# Patient Record
Sex: MALE | Race: OTHER | HISPANIC OR LATINO | URBAN - METROPOLITAN AREA
[De-identification: names, ages, dates, MRNs, and addresses within clinical notes are randomized per-mention and may not be internally consistent; named-entity substitution may affect disease eponyms.]

---

## 2020-10-30 ENCOUNTER — EMERGENCY (EMERGENCY)
Facility: HOSPITAL | Age: 65
LOS: 1 days | Discharge: HOME | End: 2020-10-30
Attending: STUDENT IN AN ORGANIZED HEALTH CARE EDUCATION/TRAINING PROGRAM
Payer: SELF-PAY

## 2020-10-30 VITALS
OXYGEN SATURATION: 97 % | RESPIRATION RATE: 18 BRPM | DIASTOLIC BLOOD PRESSURE: 111 MMHG | TEMPERATURE: 98 F | SYSTOLIC BLOOD PRESSURE: 180 MMHG | HEART RATE: 66 BPM

## 2020-10-30 VITALS
OXYGEN SATURATION: 98 % | SYSTOLIC BLOOD PRESSURE: 155 MMHG | HEART RATE: 54 BPM | RESPIRATION RATE: 16 BRPM | DIASTOLIC BLOOD PRESSURE: 97 MMHG

## 2020-10-30 LAB
ALBUMIN SERPL ELPH-MCNC: 4.1 G/DL — SIGNIFICANT CHANGE UP (ref 3.3–5.2)
ALP SERPL-CCNC: 74 U/L — SIGNIFICANT CHANGE UP (ref 40–120)
ALT FLD-CCNC: 12 U/L — SIGNIFICANT CHANGE UP
ANION GAP SERPL CALC-SCNC: 15 MMOL/L — SIGNIFICANT CHANGE UP (ref 5–17)
APTT BLD: 30.6 SEC — SIGNIFICANT CHANGE UP (ref 27.5–35.5)
AST SERPL-CCNC: 19 U/L — SIGNIFICANT CHANGE UP
BASOPHILS # BLD AUTO: 0.04 K/UL — SIGNIFICANT CHANGE UP (ref 0–0.2)
BASOPHILS NFR BLD AUTO: 0.6 % — SIGNIFICANT CHANGE UP (ref 0–2)
BILIRUB SERPL-MCNC: 0.4 MG/DL — SIGNIFICANT CHANGE UP (ref 0.4–2)
BUN SERPL-MCNC: 15 MG/DL — SIGNIFICANT CHANGE UP (ref 8–20)
CALCIUM SERPL-MCNC: 8.8 MG/DL — SIGNIFICANT CHANGE UP (ref 8.6–10.2)
CHLORIDE SERPL-SCNC: 103 MMOL/L — SIGNIFICANT CHANGE UP (ref 98–107)
CO2 SERPL-SCNC: 24 MMOL/L — SIGNIFICANT CHANGE UP (ref 22–29)
CREAT SERPL-MCNC: 1.01 MG/DL — SIGNIFICANT CHANGE UP (ref 0.5–1.3)
EOSINOPHIL # BLD AUTO: 0.42 K/UL — SIGNIFICANT CHANGE UP (ref 0–0.5)
EOSINOPHIL NFR BLD AUTO: 6 % — SIGNIFICANT CHANGE UP (ref 0–6)
GLUCOSE SERPL-MCNC: 94 MG/DL — SIGNIFICANT CHANGE UP (ref 70–99)
HCT VFR BLD CALC: 47.9 % — SIGNIFICANT CHANGE UP (ref 39–50)
HGB BLD-MCNC: 16.4 G/DL — SIGNIFICANT CHANGE UP (ref 13–17)
IMM GRANULOCYTES NFR BLD AUTO: 0.3 % — SIGNIFICANT CHANGE UP (ref 0–1.5)
INR BLD: 1.05 RATIO — SIGNIFICANT CHANGE UP (ref 0.88–1.16)
LIDOCAIN IGE QN: 23 U/L — SIGNIFICANT CHANGE UP (ref 22–51)
LYMPHOCYTES # BLD AUTO: 2.02 K/UL — SIGNIFICANT CHANGE UP (ref 1–3.3)
LYMPHOCYTES # BLD AUTO: 28.9 % — SIGNIFICANT CHANGE UP (ref 13–44)
MCHC RBC-ENTMCNC: 31.8 PG — SIGNIFICANT CHANGE UP (ref 27–34)
MCHC RBC-ENTMCNC: 34.2 GM/DL — SIGNIFICANT CHANGE UP (ref 32–36)
MCV RBC AUTO: 93 FL — SIGNIFICANT CHANGE UP (ref 80–100)
MONOCYTES # BLD AUTO: 0.44 K/UL — SIGNIFICANT CHANGE UP (ref 0–0.9)
MONOCYTES NFR BLD AUTO: 6.3 % — SIGNIFICANT CHANGE UP (ref 2–14)
NEUTROPHILS # BLD AUTO: 4.06 K/UL — SIGNIFICANT CHANGE UP (ref 1.8–7.4)
NEUTROPHILS NFR BLD AUTO: 57.9 % — SIGNIFICANT CHANGE UP (ref 43–77)
NT-PROBNP SERPL-SCNC: 11 PG/ML — SIGNIFICANT CHANGE UP (ref 0–300)
PLATELET # BLD AUTO: 209 K/UL — SIGNIFICANT CHANGE UP (ref 150–400)
POTASSIUM SERPL-MCNC: 4 MMOL/L — SIGNIFICANT CHANGE UP (ref 3.5–5.3)
POTASSIUM SERPL-SCNC: 4 MMOL/L — SIGNIFICANT CHANGE UP (ref 3.5–5.3)
PROT SERPL-MCNC: 7 G/DL — SIGNIFICANT CHANGE UP (ref 6.6–8.7)
PROTHROM AB SERPL-ACNC: 12.2 SEC — SIGNIFICANT CHANGE UP (ref 10.6–13.6)
RBC # BLD: 5.15 M/UL — SIGNIFICANT CHANGE UP (ref 4.2–5.8)
RBC # FLD: 11.9 % — SIGNIFICANT CHANGE UP (ref 10.3–14.5)
SODIUM SERPL-SCNC: 142 MMOL/L — SIGNIFICANT CHANGE UP (ref 135–145)
TROPONIN T SERPL-MCNC: <0.01 NG/ML — SIGNIFICANT CHANGE UP (ref 0–0.06)
TROPONIN T SERPL-MCNC: <0.01 NG/ML — SIGNIFICANT CHANGE UP (ref 0–0.06)
WBC # BLD: 7 K/UL — SIGNIFICANT CHANGE UP (ref 3.8–10.5)
WBC # FLD AUTO: 7 K/UL — SIGNIFICANT CHANGE UP (ref 3.8–10.5)

## 2020-10-30 PROCEDURE — 80053 COMPREHEN METABOLIC PANEL: CPT

## 2020-10-30 PROCEDURE — 36415 COLL VENOUS BLD VENIPUNCTURE: CPT

## 2020-10-30 PROCEDURE — 93005 ELECTROCARDIOGRAM TRACING: CPT

## 2020-10-30 PROCEDURE — 85730 THROMBOPLASTIN TIME PARTIAL: CPT

## 2020-10-30 PROCEDURE — 83690 ASSAY OF LIPASE: CPT

## 2020-10-30 PROCEDURE — 99285 EMERGENCY DEPT VISIT HI MDM: CPT

## 2020-10-30 PROCEDURE — 99284 EMERGENCY DEPT VISIT MOD MDM: CPT | Mod: 25

## 2020-10-30 PROCEDURE — 83880 ASSAY OF NATRIURETIC PEPTIDE: CPT

## 2020-10-30 PROCEDURE — 85025 COMPLETE CBC W/AUTO DIFF WBC: CPT

## 2020-10-30 PROCEDURE — 99283 EMERGENCY DEPT VISIT LOW MDM: CPT

## 2020-10-30 PROCEDURE — 71045 X-RAY EXAM CHEST 1 VIEW: CPT

## 2020-10-30 PROCEDURE — 85610 PROTHROMBIN TIME: CPT

## 2020-10-30 PROCEDURE — 93010 ELECTROCARDIOGRAM REPORT: CPT

## 2020-10-30 PROCEDURE — 96372 THER/PROPH/DIAG INJ SC/IM: CPT

## 2020-10-30 PROCEDURE — 84484 ASSAY OF TROPONIN QUANT: CPT

## 2020-10-30 PROCEDURE — 71045 X-RAY EXAM CHEST 1 VIEW: CPT | Mod: 26

## 2020-10-30 RX ORDER — NITROGLYCERIN 6.5 MG
0.4 CAPSULE, EXTENDED RELEASE ORAL ONCE
Refills: 0 | Status: COMPLETED | OUTPATIENT
Start: 2020-10-30 | End: 2020-10-30

## 2020-10-30 RX ORDER — FAMOTIDINE 10 MG/ML
20 INJECTION INTRAVENOUS ONCE
Refills: 0 | Status: COMPLETED | OUTPATIENT
Start: 2020-10-30 | End: 2020-10-30

## 2020-10-30 RX ORDER — AMLODIPINE BESYLATE 2.5 MG/1
1 TABLET ORAL
Qty: 14 | Refills: 0
Start: 2020-10-30 | End: 2020-11-12

## 2020-10-30 RX ADMIN — FAMOTIDINE 20 MILLIGRAM(S): 10 INJECTION INTRAVENOUS at 18:43

## 2020-10-30 RX ADMIN — Medication 30 MILLILITER(S): at 18:43

## 2020-10-30 NOTE — CONSULT NOTE ADULT - ASSESSMENT
64M [place]-speaking ([place]  #[id number]) nonsmoker with no known past medical history (no PMD) presents with mid-sternal/upper throat chest pain started at 12pm yesterday, EKG and 1st set Troponin negative, cardiology consulted for evaluation.     Atypical chest pain as nonexertional and chronic for >3 years with overall low cardiac risk factors, baseline EKG unremarkable except for possible ectopic atrial rhythm, suspect etiology from GERD.     -repeat 2nd troponin if within normal then stable from cardiac standpoint for discharge home  -advised to establish care with a medical clinic  -trial of empiric PPI daily for acid reflux, avoid fried/sour/spicy food items  -can follow up in cardiology office if recurrent symptoms for elective stress test as outpatient.         Elver Malcolm DO, [facility]  Faculty Non-Invasive Cardiologist  [phone] 64M [place]-speaking ([place]  #[id number]) nonsmoker with no known past medical history (no PMD) presents with mid-sternal/upper throat chest pain started at 12pm yesterday, EKG and 1st set Troponin negative, was given regimen of Pepcid/Maalox and sublingual nitro in the ER, noted hypertensive (-180s/90s-110s), cardiology consulted for evaluation.     Atypical chest pain as nonexertional and chronic for >3 years with overall low cardiac risk factors, baseline EKG unremarkable except for possible ectopic atrial rhythm, suspect etiology from GERD.       1. Atypical chest pain  -repeat 2nd troponin if within normal then stable from cardiac standpoint for discharge home  -trial of empiric PPI daily for acid reflux, avoid fried/sour/spicy food items  -GI eval. outpatient      2. Elevated BP readings  -suspect undiagnosed HTN  -low salt diet, start amlodipine 5mg daily if BP persistently elevated          -advised to establish care with a medical clinic  -can follow up in cardiology office if recurrent symptoms for elective stress test as outpatient.         Elver Malcolm DO, [facility]  Faculty Non-Invasive Cardiologist  [phone]

## 2020-10-30 NOTE — ED ADULT NURSE NOTE - OBJECTIVE STATEMENT
pt presents with c/o SSCP /  chest pain  progressively worsening over the past few years. (denies pmh / hasn't seen a doctor in years. ) denies diap, sob or rad. + epigastric burning after eating . info per  and granddaughter. bilateral clear breath sounds, abd soft nontender, no pedal edema.

## 2020-10-30 NOTE — ED PROVIDER NOTE - NSFOLLOWUPCLINICS_GEN_ALL_ED_FT
[facility]  Cardiology  [address] [place] [address]  Phone: [phone]  Fax:   Follow Up Time:

## 2020-10-30 NOTE — ED PROVIDER NOTE - OBJECTIVE STATEMENT
64y M w/ no known PMH, presenting for chest pain.  Pt reports that he has had intermittent similar episodes over the past 3 years, but never saw a doctor.  Yesterday at work, he developed substernal pressure radiating up to the jaw, associated with shortness of breath.  Reports having a bad taste in his mouth.  Symptoms started after eating.  Denies nausea, vomiting, dizziness, palpitations, diaphoresis, radiation to back or arms.  Symptoms are improved at this time, but continues to have mild discomfort around his neck.  Pt has never seen a physician.  Does not smoke.

## 2020-10-30 NOTE — ED PROVIDER NOTE - PROGRESS NOTE DETAILS
Abran DORADO: Consult placed to [facility]. Patient 2nd troponin negative. medication sent to pharmacy as per cardiology recommendations. Patient to be d/c'd. Conversation had with patient regarding results of labwork and imaging. Patient agrees with plan for discharge at this time. Patient agrees to comply with follow up with PCP. Return to ED precautions and discharge instructions given to patient.

## 2020-10-30 NOTE — ED PROVIDER NOTE - NS ED ROS FT
Constitutional: no fever, no chills  Eyes: no vision changes  ENT: no nasal congestion, no sore throat  CV: +chest pain  Resp: no cough, +shortness of breath  GI: no abdominal pain, no vomiting, no diarrhea  : no dysuria  MSK: no joint pain  Skin: no rash  Neuro: no headache, no weakness, no paresthesias

## 2020-10-30 NOTE — ED ADULT NURSE REASSESSMENT NOTE - NS ED NURSE REASSESS COMMENT FT1
Received report from offgoing RN. Charting as noted. Patient A&OX3 in no apparent distress. Denies any chest pain at this time. States chest pain started yesterday but has resolved while in ED. Vital signs stable at this time. Patient remains on cardiac monitor with continuos pulse ox. Respirations even, spontaneous, and unlabored. Skin warm and dry. Awaiting further results. Plan of care explained. Verbalized understanding. Call bell within reach. Granddaughter at bedside.

## 2020-10-30 NOTE — CONSULT NOTE ADULT - SUBJECTIVE AND OBJECTIVE BOX
[facility]                                                               Office: [address] [address]                                                              Telephone: [phone]. Fax:[phone]                                                                        CARDIOLOGY CONSULTATION NOTE                                                                                            Chief complaint:  Chest pain    HPI:  64M Indian-speaking ([place]  #[id number]) nonsmoker with no known past medical history (no PMD) presents with mid-sternal/upper throat chest pain started at 12pm yesterday, EKG and 1st set Troponin negative, was given regimen of Pepcid/Maalox and sublingual nitro in the ER, cardiology consulted for evaluation.     He reports pain started after eating his breakfast (coffee, fried plantains, croissant and been having intermittent similar chest pain for 3 years, usually drink some home-remedy of a solution for relief, never seek medical care in the past for these symptoms, but yesterday also noted with associated shortness of breath, reports still have residual discomfort today but is now better, however due to lack of transportation yesterday did not seek immediate medical attention. He does not have a PMD and takes no other medication at home. His granddaughter is a bedside with him, working day jobs (landscaping, , car washing) and denies any exertional chest pain or shortness of breath. No family history of CAD, denies smoking or alcohol use.         REVIEW OF SYMPTOMS:     CONSTITUTIONAL: No fever, weight loss, or fatigue  ENMT:  No difficulty hearing, tinnitus, vertigo; No sinus or throat pain  NECK: No pain or stiffness  CARDIOVASCULAR: as per HPI  RESPIRATORY: No Dyspnea on exertion, Shortness of breath, cough, wheezing  : No dysuria, no hematuria   GI: No dark color stool, no melena, no diarrhea, no constipation, no abdominal pain   NEURO: No headache, no dizziness, no slurred speech   MUSCULOSKELETAL: No joint pain or swelling; No muscle, back, or extremity pain  PSYCH: No agitation, no anxiety.    ALL OTHER REVIEW OF SYSTEMS ARE NEGATIVE.      PREVIOUS DIAGNOSTIC TESTING    ECHO FINDINGS:  None      STRESS FINDINGS:  None    CATHETERIZATION FINDINGS:   None      ALLERGIES: Allergies    No Known Allergies        PAST MEDICAL HISTORY  None      PAST SURGICAL HISTORY  None      FAMILY HISTORY:  No CAD or sudden death      SOCIAL HISTORY:    CIGARETTES:   denies  ALCOHOL: denies  DRUGS: denies      CURRENT MEDICATIONS:  MEDICATIONS  (STANDING):    MEDICATIONS  (PRN):         HOME MEDICATIONS:  None      Vital Signs Last 24 Hrs  T(C): 37.1 (10-30-20 @ 18:27), Max: 37.1 (10-30-20 @ 18:27)  T(F): 98.7 (10-30-20 @ 18:27), Max: 98.7 (10-30-20 @ 18:27)  HR: 54 (10-30-20 @ 19:33) (53 - 66)  BP: 155/97 (10-30-20 @ 19:33) (152/94 - 180/111)  BP(mean): --  RR: 16 (10-30-20 @ 19:33) (16 - 20)  SpO2: 98% (10-30-20 @ 19:33) (97% - 99%)  I&O's Summary      Appearance: Comfortable. No acute distress  HEENT:  Head and neck: Atraumatic. Normocephalic.  Normal oral mucosa, PERRL, Neck is supple. No JVD, No carotid bruit.   Neurologic: A&Ox 3, no focal deficits. EOMI, Cranial nerves are intact.  Lymphatic: No cervical lymphadenopathy  Cardiovascular: Normal S1 S2, RRR, No murmur, rubs/gallops. No JVD, No edema  Respiratory: Lungs clear to auscultation  Gastrointestinal:  Soft, Non-tender, + BS  Lower Extremities: No edema; fingers with dusts  Psychiatry: Patient is calm. No agitation. Mood & affect appropriate  Skin: No rashes/ecchymoses/cyanosis/ulcers visualized on the face, hands or feet.      Labs:                         16.4   7.00  )-----------( 209      ( 30 Oct 2020 18:39 )             47.9     10-30    142  |  103  |  15.0  ----------------------------<  94  4.0   |  24.0  |  1.01    Ca    8.8      30 Oct 2020 18:39    TPro  7.0  /  Alb  4.1  /  TBili  0.4  /  DBili  x   /  AST  19  /  ALT  12  /  AlkPhos  74  10-30     30 Oct 2020 18:39 Troponin <0.01 ng/mL / Creatine Kinase x     /  CKMB x     / CPK Mass Assay % x          Serum Pro-Brain Natriuretic Peptide: 11 pg/mL (10-30-20 @ 18:39)      TELEMETRY: sinus 50-60s  ECG:  normal sinus vs. ectopic atrial rhythm, no ST-T changes, QTc 386    Chest Xray- prelim within normal [facility]                                                               Office: [address] [address]                                                              Telephone: [phone]. Fax:[phone]                                                                        CARDIOLOGY CONSULTATION NOTE                                                                                            Chief complaint:  Chest pain    HPI:  64M Citizen of [place]-speaking ([place]  #[id number]) nonsmoker with no known past medical history (no PMD) presents with mid-sternal/upper throat chest pain started at 12pm yesterday, EKG and 1st set Troponin negative, was given regimen of Pepcid/Maalox and sublingual nitro in the ER, noted hypertensive (-180s/90s-110s), cardiology consulted for evaluation.     He reports pain started after eating his breakfast (coffee, fried plantains, croissant and been having intermittent similar chest pain for 3 years, usually drink some home-remedy of a solution for relief, never seek medical care in the past for these symptoms, but yesterday also noted with associated shortness of breath, reports still have residual discomfort today but is now better, however due to lack of transportation yesterday did not seek immediate medical attention. Denies prior history of HTN however he does not have a PMD and takes no other medication at home. His granddaughter is a bedside with him, working day jobs (landscaping, , car washing) and denies any exertional chest pain or shortness of breath. No family history of CAD, denies smoking or alcohol use.         REVIEW OF SYMPTOMS:     CONSTITUTIONAL: No fever, weight loss, or fatigue  ENMT:  No difficulty hearing, tinnitus, vertigo; No sinus or throat pain  NECK: No pain or stiffness  CARDIOVASCULAR: as per HPI  RESPIRATORY: No Dyspnea on exertion, Shortness of breath, cough, wheezing  : No dysuria, no hematuria   GI: No dark color stool, no melena, no diarrhea, no constipation, no abdominal pain   NEURO: No headache, no dizziness, no slurred speech   MUSCULOSKELETAL: No joint pain or swelling; No muscle, back, or extremity pain  PSYCH: No agitation, no anxiety.    ALL OTHER REVIEW OF SYSTEMS ARE NEGATIVE.      PREVIOUS DIAGNOSTIC TESTING    ECHO FINDINGS:  None      STRESS FINDINGS:  None    CATHETERIZATION FINDINGS:   None      ALLERGIES: Allergies    No Known Allergies        PAST MEDICAL HISTORY  None      PAST SURGICAL HISTORY  None      FAMILY HISTORY:  No CAD or sudden death      SOCIAL HISTORY:    CIGARETTES:   denies  ALCOHOL: denies  DRUGS: denies      CURRENT MEDICATIONS:  MEDICATIONS  (STANDING):    MEDICATIONS  (PRN):         HOME MEDICATIONS:  None      Vital Signs Last 24 Hrs  T(C): 37.1 (10-30-20 @ 18:27), Max: 37.1 (10-30-20 @ 18:27)  T(F): 98.7 (10-30-20 @ 18:27), Max: 98.7 (10-30-20 @ 18:27)  HR: 54 (10-30-20 @ 19:33) (53 - 66)  BP: 155/97 (10-30-20 @ 19:33) (152/94 - 180/111)  BP(mean): --  RR: 16 (10-30-20 @ 19:33) (16 - 20)  SpO2: 98% (10-30-20 @ 19:33) (97% - 99%)  I&O's Summary      Appearance: Comfortable. No acute distress  HEENT:  Head and neck: Atraumatic. Normocephalic.  Normal oral mucosa, PERRL, Neck is supple. No JVD, No carotid bruit.   Neurologic: A&Ox 3, no focal deficits. EOMI, Cranial nerves are intact.  Lymphatic: No cervical lymphadenopathy  Cardiovascular: Normal S1 S2, RRR, No murmur, rubs/gallops. No JVD, No edema  Respiratory: Lungs clear to auscultation  Gastrointestinal:  Soft, Non-tender, + BS  Lower Extremities: No edema; fingers with dusts  Psychiatry: Patient is calm. No agitation. Mood & affect appropriate  Skin: No rashes/ecchymoses/cyanosis/ulcers visualized on the face, hands or feet.      Labs:                         16.4   7.00  )-----------( 209      ( 30 Oct 2020 18:39 )             47.9     10-30    142  |  103  |  15.0  ----------------------------<  94  4.0   |  24.0  |  1.01    Ca    8.8      30 Oct 2020 18:39    TPro  7.0  /  Alb  4.1  /  TBili  0.4  /  DBili  x   /  AST  19  /  ALT  12  /  AlkPhos  74  10-30     30 Oct 2020 18:39 Troponin <0.01 ng/mL / Creatine Kinase x     /  CKMB x     / CPK Mass Assay % x          Serum Pro-Brain Natriuretic Peptide: 11 pg/mL (10-30-20 @ 18:39)      TELEMETRY: sinus 50-60s  ECG:  normal sinus vs. ectopic atrial rhythm, no ST-T changes, QTc 386    Chest Xray- prelim within normal

## 2020-10-30 NOTE — ED PROVIDER NOTE - CLINICAL SUMMARY MEDICAL DECISION MAKING FREE TEXT BOX
64y M w/ no known PMH, presenting for episode of chest pain yesterday.  Pt in no acute distress at this time.  He has never seen a physician.  Will obtain EKG, CXR, labs.  Treat with pepcid, maalox, reassess.

## 2020-10-30 NOTE — ED PROVIDER NOTE - ATTENDING CONTRIBUTION TO CARE
64 YOM no known PMH here for chest pressure yesterday while at work. Still with mild pressure in upper chest throat region. Has never seen a doctor in his life. Similar symptoms years ago but never saw doctor. Nonsmoker. Denies f/c, cough, abd pain, leg swelling  AP - ekg nonischemic. hypertensive here. patient with heart score 3, pre troponin. labs, eval for acs. reassess

## 2020-10-30 NOTE — ED ADULT TRIAGE NOTE - CHIEF COMPLAINT QUOTE
abdominal pain radiating into chest x3 years, associated with eating.  patient is hypertensive in triage, does not see a doctor so no known diagnosis of htn.

## 2024-07-02 NOTE — ED PROVIDER NOTE - PATIENT PORTAL LINK FT
Pt reports already having nurtec and ubrelvy reclarified instructions. Pt vu will try these and will let us know if it doesn't help    You can access the FollowMyHealth Patient Portal offered by [facility] by registering at the following website: [email or web address]. By joining [facility]’s FollowMyHealth portal, you will also be able to view your health information using other applications (apps) compatible with our system.